# Patient Record
Sex: MALE
[De-identification: names, ages, dates, MRNs, and addresses within clinical notes are randomized per-mention and may not be internally consistent; named-entity substitution may affect disease eponyms.]

---

## 2021-02-11 ENCOUNTER — NURSE TRIAGE (OUTPATIENT)
Dept: OTHER | Facility: CLINIC | Age: 66
End: 2021-02-11

## 2021-02-12 NOTE — TELEPHONE ENCOUNTER
Reason for Disposition   Caller hangs up    Answer Assessment - Initial Assessment Questions  1. SITUATION:  Document reason for call. Patient called reporting headache following COVID vaccine and asked what medication he could take. RN explained I would need to complete triage assessment prior to giving care advice. Patient initially agreed to assessment but when going through traveling screen & contact information patient got frustrated and stated \"You know what I will just take tylenol. Thanks anyway kiddo\" and hung up. 2. BACKGROUND: Document any background information (e.g., prior calls, known psychiatric history)      N/a    3. ASSESSMENT: Document your nursing assessment. No assessment completed    4. RESPONSE: Document what your response or recommendation was.       Patient hung up prior to RN responding    Protocols used: DIFFICULT CALLER-ADULT-